# Patient Record
Sex: FEMALE | Race: OTHER | HISPANIC OR LATINO
[De-identification: names, ages, dates, MRNs, and addresses within clinical notes are randomized per-mention and may not be internally consistent; named-entity substitution may affect disease eponyms.]

---

## 2020-11-17 ENCOUNTER — TRANSCRIPTION ENCOUNTER (OUTPATIENT)
Age: 55
End: 2020-11-17

## 2021-10-05 ENCOUNTER — APPOINTMENT (OUTPATIENT)
Dept: PHYSICAL MEDICINE AND REHAB | Facility: CLINIC | Age: 56
End: 2021-10-05

## 2021-10-19 ENCOUNTER — APPOINTMENT (OUTPATIENT)
Dept: PHYSICAL MEDICINE AND REHAB | Facility: CLINIC | Age: 56
End: 2021-10-19

## 2024-04-10 ENCOUNTER — APPOINTMENT (OUTPATIENT)
Dept: VASCULAR SURGERY | Facility: CLINIC | Age: 59
End: 2024-04-10
Payer: MEDICARE

## 2024-04-10 VITALS
DIASTOLIC BLOOD PRESSURE: 98 MMHG | OXYGEN SATURATION: 97 % | HEART RATE: 79 BPM | SYSTOLIC BLOOD PRESSURE: 156 MMHG | RESPIRATION RATE: 15 BRPM | BODY MASS INDEX: 28.78 KG/M2 | HEIGHT: 58.27 IN | WEIGHT: 139 LBS | TEMPERATURE: 98.2 F

## 2024-04-10 DIAGNOSIS — J30.2 OTHER SEASONAL ALLERGIC RHINITIS: ICD-10-CM

## 2024-04-10 PROCEDURE — 99203 OFFICE O/P NEW LOW 30 MIN: CPT

## 2024-04-10 RX ORDER — FEXOFENADINE HCL 60 MG
CAPSULE ORAL
Refills: 0 | Status: ACTIVE | COMMUNITY

## 2024-04-11 PROBLEM — J30.2 SEASONAL ALLERGIES: Status: RESOLVED | Noted: 2024-04-10 | Resolved: 2024-04-11

## 2024-04-11 NOTE — ASSESSMENT
[FreeTextEntry1] : CHELSEA BERRIOS is a 58 year old female presents for evaluation.   > Venous insufficiency, CEAP C2 - Will obtain venous reflux studies for further evaluation at next visit.  - For symptom management, recommend use of compression stockings (20-30 mmhg, prescription given), leg elevation, and ambulation.

## 2024-04-11 NOTE — HISTORY OF PRESENT ILLNESS
[FreeTextEntry1] : CHELSEA BERRIOS is a 58 year old female who presents for evaluation of varicose veins, leg pain.   Referred by Dr. Ofelia Leahy.   Patient states that she has had varicose veins for many years with worsening pain in both legs. She reports leg heaviness. She reports leg discomfort for which she take Tylenol, which helps it a little. She is having worsening spider veins.  Personal history of DVT - None Family history of DVT - None Family history of venous insufficiency or varicose veins - aunt, daughter Current compression stocking usage - No Had 2 children Worked as a , standing.   + PMH: arthritis, low back pain from herniated discs + PSH: denies + FH: diabetes, htn, hld + SH: never smoker, social etoh use, no illicit substance use  + Aller: NKDA  PCP is Dr. Ofelia Leahy.

## 2024-04-11 NOTE — PHYSICAL EXAM
[2+] : left 2+ [Ankle Swelling (On Exam)] : not present [Varicose Veins Of Lower Extremities] : bilaterally [Ankle Swelling On The Right] : mild [] : not present [Calm] : calm [de-identified] : Well-appearing  [de-identified] : EOMI, anicteric  [de-identified] : soft, nt, nd  [de-identified] : moves all four extremities [de-identified] : Significant bilateral spider and reticular veins with tenderness [de-identified] : A&Ox4

## 2024-05-08 ENCOUNTER — APPOINTMENT (OUTPATIENT)
Dept: VASCULAR SURGERY | Facility: CLINIC | Age: 59
End: 2024-05-08
Payer: MEDICARE

## 2024-05-08 VITALS
TEMPERATURE: 98 F | HEIGHT: 58 IN | RESPIRATION RATE: 14 BRPM | BODY MASS INDEX: 28.76 KG/M2 | SYSTOLIC BLOOD PRESSURE: 154 MMHG | DIASTOLIC BLOOD PRESSURE: 89 MMHG | WEIGHT: 137 LBS | HEART RATE: 62 BPM | OXYGEN SATURATION: 98 %

## 2024-05-08 DIAGNOSIS — I83.893 VARICOSE VEINS OF BILATERAL LOWER EXTREMITIES WITH OTHER COMPLICATIONS: ICD-10-CM

## 2024-05-08 PROCEDURE — 99213 OFFICE O/P EST LOW 20 MIN: CPT

## 2024-05-08 PROCEDURE — 93970 EXTREMITY STUDY: CPT

## 2024-05-14 PROBLEM — I83.893 VARICOSE VEINS OF BILATERAL LOWER EXTREMITIES WITH OTHER COMPLICATIONS: Status: ACTIVE | Noted: 2024-04-11

## 2024-05-14 NOTE — PHYSICAL EXAM
[2+] : left 2+ [Varicose Veins Of Lower Extremities] : bilaterally [Ankle Swelling On The Right] : mild [Calm] : calm [Ankle Swelling (On Exam)] : not present [] : not present [de-identified] : Well-appearing  [de-identified] : EOMI, anicteric  [de-identified] : soft, nt, nd  [de-identified] : moves all four extremities [de-identified] : Significant bilateral spider and reticular veins with tenderness [de-identified] : A&Ox4

## 2024-05-14 NOTE — CONSULT LETTER
[Dear  ___] : Dear  [unfilled], [Consult Letter:] : I had the pleasure of evaluating your patient, [unfilled]. [Please see my note below.] : Please see my note below. [Consult Closing:] : Thank you very much for allowing me to participate in the care of this patient.  If you have any questions, please do not hesitate to contact me. [Sincerely,] : Sincerely, [FreeTextEntry1] : She presents to me for evaluation of lower extremity pain and painful varicosities.  On exam she has palpable lower extremity pulses.  She has significant bilateral lower extremity telangiectasias.  Venous duplex shows no acute DVT or SVT.  There is no reflux bilaterally.  We will try medical therapy with compression stockings, leg elevation, and ambulation and I plan to see her again in 3 months. [FreeTextEntry3] :     Mariam Black MD, FACS, [facility] Division of Vascular & Endovascular Surgery [facility], Department of Surgery

## 2024-05-14 NOTE — DATA REVIEWED
[FreeTextEntry1] : 5/8/2024-bilateral lower extremity venous duplex Negative for DVT or SVT bilaterally.  Negative for reflux bilaterally.

## 2024-05-14 NOTE — HISTORY OF PRESENT ILLNESS
[FreeTextEntry1] : CHELSEA BERRIOS is a 58 year old female who presents for evaluation of varicose veins, leg pain.   Referred by Dr. Ofelia Leahy.   Patient states that she has had varicose veins for many years with worsening pain in both legs. She reports leg heaviness. She reports leg discomfort for which she take Tylenol, which helps it a little. She is having worsening spider veins.  Personal history of DVT - None Family history of DVT - None Family history of venous insufficiency or varicose veins - aunt, daughter Current compression stocking usage - No Had 2 children Worked as a , standing.   + PMH: arthritis, low back pain from herniated discs + PSH: denies + FH: diabetes, htn, hld + SH: never smoker, social etoh use, no illicit substance use  + Aller: NKDA  PCP is Dr. Ofelia Leahy. [de-identified] : 5/8/2024-Patient presents for follow-up.  She reports continued lower extremity pain and heaviness.  Has not yet started to use compression stockings.

## 2024-05-14 NOTE — ASSESSMENT
[FreeTextEntry1] : CHELSEA BERRIOS is a 58 year old female presents for evaluation.   > Venous insufficiency, CEAP C2 -Reviewed results of duplex with patient.  There is no evidence of DVT or SVT bilaterally there is no evidence of venous insufficiency bilaterally. -Will trial compression stockings, leg elevation, and ambulation.  Follow-up in 3 months.

## 2024-08-07 ENCOUNTER — APPOINTMENT (OUTPATIENT)
Dept: VASCULAR SURGERY | Facility: CLINIC | Age: 59
End: 2024-08-07